# Patient Record
Sex: MALE | Race: BLACK OR AFRICAN AMERICAN | NOT HISPANIC OR LATINO | Employment: FULL TIME | URBAN - METROPOLITAN AREA
[De-identification: names, ages, dates, MRNs, and addresses within clinical notes are randomized per-mention and may not be internally consistent; named-entity substitution may affect disease eponyms.]

---

## 2020-03-11 ENCOUNTER — TELEPHONE (OUTPATIENT)
Dept: FAMILY MEDICINE | Facility: CLINIC | Age: 25
End: 2020-03-11

## 2020-03-11 DIAGNOSIS — B20 HIV INFECTION, UNSPECIFIED SYMPTOM STATUS: ICD-10-CM

## 2020-03-11 DIAGNOSIS — Z79.899 LONG TERM USE OF DRUG: Primary | ICD-10-CM

## 2020-03-11 NOTE — TELEPHONE ENCOUNTER
Can you put in ID order for pt who wants to go to [facility] today? He needs HIV RNA, CD4, RPR, CBC, CMP. Thanks.

## 2020-05-25 ENCOUNTER — OFFICE VISIT (OUTPATIENT)
Dept: FAMILY MEDICINE | Facility: CLINIC | Age: 25
End: 2020-05-25
Payer: COMMERCIAL

## 2020-05-25 VITALS
RESPIRATION RATE: 16 BRPM | WEIGHT: 176 LBS | BODY MASS INDEX: 27.62 KG/M2 | HEIGHT: 67 IN | TEMPERATURE: 98 F | DIASTOLIC BLOOD PRESSURE: 71 MMHG | OXYGEN SATURATION: 98 % | SYSTOLIC BLOOD PRESSURE: 122 MMHG | HEART RATE: 76 BPM

## 2020-05-25 DIAGNOSIS — B20 HIV INFECTION, UNSPECIFIED SYMPTOM STATUS: ICD-10-CM

## 2020-05-25 PROBLEM — Z21 HIV (HUMAN IMMUNODEFICIENCY VIRUS INFECTION): Status: ACTIVE | Noted: 2020-05-25

## 2020-05-25 PROCEDURE — 99213 OFFICE O/P EST LOW 20 MIN: CPT | Mod: S$GLB,,, | Performed by: NURSE PRACTITIONER

## 2020-05-25 PROCEDURE — 3008F PR BODY MASS INDEX (BMI) DOCUMENTED: ICD-10-PCS | Mod: CPTII,S$GLB,, | Performed by: NURSE PRACTITIONER

## 2020-05-25 PROCEDURE — 3008F BODY MASS INDEX DOCD: CPT | Mod: CPTII,S$GLB,, | Performed by: NURSE PRACTITIONER

## 2020-05-25 PROCEDURE — 99213 PR OFFICE/OUTPT VISIT, EST, LEVL III, 20-29 MIN: ICD-10-PCS | Mod: S$GLB,,, | Performed by: NURSE PRACTITIONER

## 2020-05-25 NOTE — PROGRESS NOTES
Infectious Diseases Clinic Note    Subjective:       Patient ID: Garett Villanueva is a 25 y.o. male     Chief Complaint: Annual Exam (Yearly ID f/u. Review labs. )        Garett Villanueva is a 25 y.o. male here today for yearly f/u regarding HIV.  This is a new diagnosis to me.    The patient has no PCP.  In regards to his HIV, the patient has not been on medication for at least a year.  He is a  and attributes it to traveling.  When he was on Stribild, he reported no side effects from the medication.  Denies opportunistic infections currently. Denies flu like symptoms. Patient had labs drawn prior to arrival and is here to discuss them.  Last seen in 2018.   No acute issues reported at this time.              Past Medical History:   Diagnosis Date    HIV (human immunodeficiency virus infection)        Social History     Socioeconomic History    Marital status: Single     Spouse name: Not on file    Number of children: Not on file    Years of education: Not on file    Highest education level: Not on file   Occupational History    Not on file   Social Needs    Financial resource strain: Not on file    Food insecurity:     Worry: Not on file     Inability: Not on file    Transportation needs:     Medical: Not on file     Non-medical: Not on file   Tobacco Use    Smoking status: Not on file   Substance and Sexual Activity    Alcohol use: Not on file    Drug use: Not on file    Sexual activity: Not on file   Lifestyle    Physical activity:     Days per week: Not on file     Minutes per session: Not on file    Stress: Not on file   Relationships    Social connections:     Talks on phone: Not on file     Gets together: Not on file     Attends Faith service: Not on file     Active member of club or organization: Not on file     Attends meetings of clubs or organizations: Not on file     Relationship status: Not on file   Other Topics Concern    Not on file   Social History Narrative     Not on file         Current Outpatient Medications:     orikstvd-fybyigvi-vqkyyu-tenof, STRIBILD, [phone] mg (STRIBILD) [phone] mg per tablet, Take 1 tablet by mouth once daily., Disp: 90 tablet, Rfl: 3    Review of Systems   Constitutional: Negative for chills, fever and unexpected weight change.   Respiratory: Negative.    Cardiovascular: Negative.    Gastrointestinal: Negative.    Musculoskeletal: Negative for neck pain.   Skin: Negative for rash.   Hematological: Negative for adenopathy. Does not bruise/bleed easily.           Objective:      Vitals:    05/25/20 1454   BP: 122/71   Pulse: 76   Resp: 16   Temp: 97.6 °F (36.4 °C)     Physical Exam   Constitutional: He is oriented to person, place, and time. He appears well-developed and well-nourished. No distress.   HENT:   Head: Normocephalic.   Eyes: Pupils are equal, round, and reactive to light.   Cardiovascular: Normal rate.   Pulmonary/Chest: Effort normal.   Musculoskeletal: He exhibits no edema.   Neurological: He is alert and oriented to person, place, and time.   Skin: Skin is warm and dry. No rash noted.   Psychiatric: He has a normal mood and affect. His behavior is normal. Thought content normal.   Nursing note and vitals reviewed.          Assessment/Plan:       1. HIV infection, unspecified symptom status      Problem List Items Addressed This Visit        ID    HIV (human immunodeficiency virus infection)    Current Assessment & Plan     Striibild  - has been off for approximately 1 year.  Will start him back on the medication and see him in 6 months unless side effects developed.  He has tolerated this medication in the past.     CD4 count 385  CD4% 13  RPR nonreactive  CBC showed erythrocytosis - discussed need for PCP to work this up. 2/t altitude?  CMP unremarkable    HIV RNA results are not present for review at this time/ apparently [facility] did not draw HIV RNA.  Will have him repeat test in 6 mts.      Retrieved lab from 2018.  HIV RNA was <20, however there was no CD4/CD8 count to compare to today's results.          Relevant Medications    hgdlkfvo-mebwhtdn-qhoepo-tenof, STRIBILD, [phone] mg (STRIBILD) [phone] mg per tablet         Patient will follow-up in 6 months.  All questions were answered to his liking.  He will call 2 weeks in advance to his appointment for labs.  All current labs discussed with the patient    Follow up:     There are no Patient Instructions on file for this visit.     Follow up in about 6 months (around 11/25/2020), or if symptoms worsen or fail to improve.

## 2020-05-25 NOTE — ASSESSMENT & PLAN NOTE
Striibild  - has been off for approximately 1 year.  Will start him back on the medication and see him in 6 months unless side effects developed.  He has tolerated this medication in the past.     CD4 count 385  CD4% 13  RPR nonreactive  CBC showed erythrocytosis - discussed need for PCP to work this up. 2/t altitude?  CMP unremarkable    HIV RNA results are not present for review at this time/ apparently [facility] did not draw HIV RNA.  Will have him repeat test in 6 mts.     Retrieved lab from 2018.  HIV RNA was <20, however there was no CD4/CD8 count to compare to today's results.

## 2020-11-16 ENCOUNTER — TELEPHONE (OUTPATIENT)
Dept: FAMILY MEDICINE | Facility: CLINIC | Age: 25
End: 2020-11-16

## 2021-10-18 ENCOUNTER — TELEPHONE (OUTPATIENT)
Dept: FAMILY MEDICINE | Facility: CLINIC | Age: 26
End: 2021-10-18

## 2021-10-19 ENCOUNTER — OFFICE VISIT (OUTPATIENT)
Dept: INFECTIOUS DISEASES | Facility: CLINIC | Age: 26
End: 2021-10-19
Payer: COMMERCIAL

## 2021-10-19 VITALS
OXYGEN SATURATION: 99 % | DIASTOLIC BLOOD PRESSURE: 88 MMHG | SYSTOLIC BLOOD PRESSURE: 140 MMHG | WEIGHT: 157 LBS | BODY MASS INDEX: 24.64 KG/M2 | HEIGHT: 67 IN | HEART RATE: 107 BPM

## 2021-10-19 DIAGNOSIS — B20 HIV INFECTION, UNSPECIFIED SYMPTOM STATUS: Primary | ICD-10-CM

## 2021-10-19 DIAGNOSIS — L98.9 SKIN LESIONS, GENERALIZED: ICD-10-CM

## 2021-10-19 PROCEDURE — 3077F PR MOST RECENT SYSTOLIC BLOOD PRESSURE >= 140 MM HG: ICD-10-PCS | Mod: CPTII,S$GLB,, | Performed by: NURSE PRACTITIONER

## 2021-10-19 PROCEDURE — 99214 OFFICE O/P EST MOD 30 MIN: CPT | Mod: S$GLB,,, | Performed by: NURSE PRACTITIONER

## 2021-10-19 PROCEDURE — 1159F MED LIST DOCD IN RCRD: CPT | Mod: CPTII,S$GLB,, | Performed by: NURSE PRACTITIONER

## 2021-10-19 PROCEDURE — 99214 PR OFFICE/OUTPT VISIT, EST, LEVL IV, 30-39 MIN: ICD-10-PCS | Mod: S$GLB,,, | Performed by: NURSE PRACTITIONER

## 2021-10-19 PROCEDURE — 3079F DIAST BP 80-89 MM HG: CPT | Mod: CPTII,S$GLB,, | Performed by: NURSE PRACTITIONER

## 2021-10-19 PROCEDURE — 3008F PR BODY MASS INDEX (BMI) DOCUMENTED: ICD-10-PCS | Mod: CPTII,S$GLB,, | Performed by: NURSE PRACTITIONER

## 2021-10-19 PROCEDURE — 1159F PR MEDICATION LIST DOCUMENTED IN MEDICAL RECORD: ICD-10-PCS | Mod: CPTII,S$GLB,, | Performed by: NURSE PRACTITIONER

## 2021-10-19 PROCEDURE — 3079F PR MOST RECENT DIASTOLIC BLOOD PRESSURE 80-89 MM HG: ICD-10-PCS | Mod: CPTII,S$GLB,, | Performed by: NURSE PRACTITIONER

## 2021-10-19 PROCEDURE — 3077F SYST BP >= 140 MM HG: CPT | Mod: CPTII,S$GLB,, | Performed by: NURSE PRACTITIONER

## 2021-10-19 PROCEDURE — 3008F BODY MASS INDEX DOCD: CPT | Mod: CPTII,S$GLB,, | Performed by: NURSE PRACTITIONER

## 2021-10-19 RX ORDER — IVERMECTIN 3 MG/1
TABLET ORAL
Qty: 25 TABLET | Refills: 0 | Status: SHIPPED | OUTPATIENT
Start: 2021-10-19

## 2021-10-19 RX ORDER — PERMETHRIN 50 MG/G
CREAM TOPICAL
Qty: 60 G | Refills: 6 | Status: SHIPPED | OUTPATIENT
Start: 2021-10-19 | End: 2021-10-26 | Stop reason: SDUPTHER

## 2021-10-19 RX ORDER — MUPIROCIN 20 MG/G
OINTMENT TOPICAL 2 TIMES DAILY
Qty: 22 G | Refills: 5 | Status: SHIPPED | OUTPATIENT
Start: 2021-10-19 | End: 2021-10-26 | Stop reason: SDUPTHER

## 2021-10-19 RX ORDER — IVERMECTIN 3 MG/1
TABLET ORAL
Qty: 1 TABLET | Refills: 0 | Status: CANCELLED | OUTPATIENT
Start: 2021-10-19

## 2021-10-27 RX ORDER — PERMETHRIN 50 MG/G
CREAM TOPICAL
Qty: 60 G | Refills: 6 | Status: SHIPPED | OUTPATIENT
Start: 2021-10-27

## 2021-10-27 RX ORDER — MUPIROCIN 20 MG/G
OINTMENT TOPICAL 2 TIMES DAILY
Qty: 22 G | Refills: 5 | Status: SHIPPED | OUTPATIENT
Start: 2021-10-27

## 2021-11-03 ENCOUNTER — PATIENT MESSAGE (OUTPATIENT)
Dept: FAMILY MEDICINE | Facility: CLINIC | Age: 26
End: 2021-11-03
Payer: COMMERCIAL

## 2021-11-03 DIAGNOSIS — B20 HIV INFECTION, UNSPECIFIED SYMPTOM STATUS: ICD-10-CM

## 2021-11-03 DIAGNOSIS — R21 RASH AND NONSPECIFIC SKIN ERUPTION: Primary | ICD-10-CM

## 2021-11-15 RX ORDER — TRIAMCINOLONE ACETONIDE 1 MG/G
CREAM TOPICAL 2 TIMES DAILY
Qty: 45 G | Refills: 5 | Status: SHIPPED | OUTPATIENT
Start: 2021-11-15 | End: 2022-03-16 | Stop reason: SDUPTHER

## 2021-12-13 DIAGNOSIS — B20 HIV INFECTION, UNSPECIFIED SYMPTOM STATUS: ICD-10-CM

## 2021-12-14 RX ORDER — ELVITEGRAVIR, COBICISTAT, EMTRICITABINE, AND TENOFOVIR DISOPROXIL FUMARATE 150; 150; 200; 300 MG/1; MG/1; MG/1; MG/1
1 TABLET, FILM COATED ORAL DAILY
Qty: 90 TABLET | Refills: 3 | Status: SHIPPED | OUTPATIENT
Start: 2021-12-14

## 2021-12-15 ENCOUNTER — TELEPHONE (OUTPATIENT)
Dept: FAMILY MEDICINE | Facility: CLINIC | Age: 26
End: 2021-12-15
Payer: COMMERCIAL

## 2021-12-17 ENCOUNTER — TELEPHONE (OUTPATIENT)
Dept: FAMILY MEDICINE | Facility: CLINIC | Age: 26
End: 2021-12-17
Payer: COMMERCIAL

## 2021-12-17 ENCOUNTER — PATIENT MESSAGE (OUTPATIENT)
Dept: INFECTIOUS DISEASES | Facility: CLINIC | Age: 26
End: 2021-12-17
Payer: COMMERCIAL

## 2021-12-17 RX ORDER — IMIQUIMOD 12.5 MG/.25G
CREAM TOPICAL
Qty: 24 PACKET | Refills: 1 | Status: SHIPPED | OUTPATIENT
Start: 2021-12-17 | End: 2021-12-20 | Stop reason: SDUPTHER

## 2021-12-20 RX ORDER — IMIQUIMOD 12.5 MG/.25G
CREAM TOPICAL
Qty: 24 PACKET | Refills: 1 | Status: SHIPPED | OUTPATIENT
Start: 2021-12-20

## 2022-01-04 ENCOUNTER — TELEPHONE (OUTPATIENT)
Dept: FAMILY MEDICINE | Facility: CLINIC | Age: 27
End: 2022-01-04
Payer: COMMERCIAL

## 2022-01-04 NOTE — TELEPHONE ENCOUNTER
Pt informed of letter received per [facility] stating stribild has been approved until 12/22/22 --[facility] who pt gets his med through states pt is not current with his bcbs and needs to call his ins and get active--pt is going to call his ins to find out what the problem is so he can get his meds--pt will call office back and let us know if their is anything else we need to do for this matter

## 2022-03-16 RX ORDER — TRIAMCINOLONE ACETONIDE 1 MG/G
CREAM TOPICAL 2 TIMES DAILY
Qty: 45 G | Refills: 5 | Status: SHIPPED | OUTPATIENT
Start: 2022-03-16

## 2022-05-05 ENCOUNTER — PATIENT MESSAGE (OUTPATIENT)
Dept: ADMINISTRATIVE | Facility: HOSPITAL | Age: 27
End: 2022-05-05
Payer: COMMERCIAL

## 2023-10-17 ENCOUNTER — TELEPHONE (OUTPATIENT)
Dept: FAMILY MEDICINE | Facility: CLINIC | Age: 28
End: 2023-10-17
Payer: COMMERCIAL

## 2023-10-17 DIAGNOSIS — B20 HIV INFECTION, UNSPECIFIED SYMPTOM STATUS: Primary | ICD-10-CM

## 2023-10-17 NOTE — TELEPHONE ENCOUNTER
----- Message from Arnav Crain MD sent at 10/16/2023  8:50 AM CDT -----  Contact: Pt  Sure  ----- Message -----  From: Omaira Payne MA  Sent: 10/10/2023   1:36 PM CDT  To: Arnav Crain MD    Ok to send?      ----- Message -----  From: Kim Rivera  Sent: 10/10/2023   1:32 PM CDT  To: [facility] Staff    Type:  Patient Requesting Referral    Who Called: pt   Does the patient already have the specialty appointment scheduled?:  If yes, what is the date of that appointment?:  Referral to What Specialty: infectious disease   Reason for Referral: HIV  Does the patient want the referral with a specific physician?: Sonia Marshall   [facility] the [facility]   Patient Requesting a Response?: yes   Would the patient rather a call back or a response via [facility]?  Phone & pt portal   Best Call Back Number: [phone]  Additional Information:  fax # [phone]  Needs to have the referral and the records

## 2024-10-04 ENCOUNTER — TELEPHONE (OUTPATIENT)
Dept: PRIMARY CARE CLINIC | Facility: CLINIC | Age: 29
End: 2024-10-04
Payer: COMMERCIAL